# Patient Record
Sex: FEMALE | Race: WHITE | Employment: UNEMPLOYED | URBAN - METROPOLITAN AREA
[De-identification: names, ages, dates, MRNs, and addresses within clinical notes are randomized per-mention and may not be internally consistent; named-entity substitution may affect disease eponyms.]

---

## 2020-02-20 ENCOUNTER — HOSPITAL ENCOUNTER (OUTPATIENT)
Dept: MAMMOGRAPHY | Age: 55
Discharge: HOME | End: 2020-02-20
Attending: FAMILY MEDICINE
Payer: OTHER GOVERNMENT

## 2020-02-20 DIAGNOSIS — Z12.31 VISIT FOR SCREENING MAMMOGRAM: ICD-10-CM

## 2020-02-20 PROCEDURE — 77067 SCR MAMMO BI INCL CAD: CPT

## 2020-03-25 ENCOUNTER — HOSPITAL ENCOUNTER (OUTPATIENT)
Dept: MAMMOGRAPHY | Age: 55
Discharge: HOME | End: 2020-03-25
Attending: FAMILY MEDICINE
Payer: OTHER GOVERNMENT

## 2020-03-25 ENCOUNTER — HOSPITAL ENCOUNTER (OUTPATIENT)
Dept: ULTRASOUND IMAGING | Age: 55
Discharge: HOME | End: 2020-03-25
Attending: FAMILY MEDICINE
Payer: OTHER GOVERNMENT

## 2020-03-25 DIAGNOSIS — R92.8 ABNORMAL MAMMOGRAM: ICD-10-CM

## 2020-03-25 DIAGNOSIS — R92.8 ABNORMAL FINDINGS ON DIAGNOSTIC IMAGING OF BREAST: ICD-10-CM

## 2020-03-25 PROCEDURE — 77061 BREAST TOMOSYNTHESIS UNI: CPT

## 2020-03-25 PROCEDURE — 76642 ULTRASOUND BREAST LIMITED: CPT

## 2021-03-27 ENCOUNTER — TRANSCRIBE ORDER (OUTPATIENT)
Dept: SCHEDULING | Age: 56
End: 2021-03-27

## 2021-03-27 DIAGNOSIS — Z12.31 VISIT FOR SCREENING MAMMOGRAM: Primary | ICD-10-CM

## 2021-10-15 ENCOUNTER — OFFICE VISIT (OUTPATIENT)
Dept: ORTHOPEDIC SURGERY | Age: 56
End: 2021-10-15
Payer: OTHER GOVERNMENT

## 2021-10-15 VITALS
BODY MASS INDEX: 26.31 KG/M2 | WEIGHT: 143 LBS | HEIGHT: 62 IN | OXYGEN SATURATION: 100 % | HEART RATE: 112 BPM | TEMPERATURE: 98.9 F

## 2021-10-15 DIAGNOSIS — M47.812 CERVICAL ARTHRITIS: ICD-10-CM

## 2021-10-15 DIAGNOSIS — M54.12 CERVICAL RADICULOPATHY: Primary | ICD-10-CM

## 2021-10-15 PROCEDURE — 99204 OFFICE O/P NEW MOD 45 MIN: CPT | Performed by: ORTHOPAEDIC SURGERY

## 2021-10-15 RX ORDER — HYDROXYZINE 25 MG/1
TABLET, FILM COATED ORAL
COMMUNITY
Start: 2021-10-12

## 2021-10-15 RX ORDER — OMEPRAZOLE 40 MG/1
400 CAPSULE, DELAYED RELEASE ORAL
COMMUNITY
Start: 2021-08-03

## 2021-10-15 RX ORDER — CYCLOBENZAPRINE HCL 10 MG
TABLET ORAL
COMMUNITY
Start: 2021-10-01

## 2021-10-15 RX ORDER — ACETAMINOPHEN 500 MG
TABLET ORAL
COMMUNITY
Start: 2021-10-01

## 2021-10-15 RX ORDER — GABAPENTIN 300 MG/1
300 CAPSULE ORAL 3 TIMES DAILY
COMMUNITY

## 2021-10-15 RX ORDER — TRAMADOL HYDROCHLORIDE 50 MG/1
50 TABLET ORAL
COMMUNITY

## 2021-10-15 NOTE — PROGRESS NOTES
Patient: Sánchez Plascencia                MRN: [id number]       SSN: [id number]  YOB: 1965        AGE: 48 y.o. SEX: female  Body mass index is 26.16 kg/m². PCP: Izzy Garcia MD  10/15/21    CHIEF COMPLAINT: Right arm pain    HPI: Sánchez Plascencia is a 48 y.o. female patient who presents to the office today with approximately 7 months of right arm pain. She says initially the pain began around the right posterior shoulder and has subsequently started radiating down her arm to the point where she has numbness and tingling in the small ring and long finger of the right hand. She also notices decreased sensation on the right. She has tried gabapentin, anti-inflammatories, and muscle relaxants without resolution of her symptoms. She has had a right shoulder MRI as well. Past Medical History:   Diagnosis Date    Menopause        History reviewed. No pertinent family history. Current Outpatient Medications   Medication Sig Dispense Refill    acetaminophen (TYLENOL) 500 mg tablet       cyclobenzaprine (FLEXERIL) 10 mg tablet       omeprazole (PRILOSEC) 40 mg capsule 400 mg.  hydrOXYzine HCL (ATARAX) 25 mg tablet take 1 tablet by mouth three times a day if needed      gabapentin (NEURONTIN) 300 mg capsule Take 300 mg by mouth three (3) times daily.  traMADoL (ULTRAM) 50 mg tablet Take 50 mg by mouth every six (6) hours as needed for Pain.          Allergies   Allergen Reactions    Codeine Anaphylaxis       Past Surgical History:   Procedure Laterality Date    HX HYSTERECTOMY  2007       Social History     Socioeconomic History    Marital status:      Spouse name: Not on file    Number of children: Not on file    Years of education: Not on file    Highest education level: Not on file   Occupational History    Not on file   Tobacco Use    Smoking status: Current Every Day Smoker    Smokeless tobacco: Never Used   Substance and Sexual Activity    Alcohol use: Not on file    Drug use: Not on file    Sexual activity: Not on file   Other Topics Concern    Not on file   Social History Narrative    Not on file     Social Determinants of Health     Financial Resource Strain:     Difficulty of Paying Living Expenses:    Food Insecurity:     Worried About Running Out of Food in the Last Year:     [address] in the Last Year:    Transportation Needs:     Lack of Transportation (Medical):  Lack of Transportation (Non-Medical):    Physical Activity:     Days of Exercise per Week:     Minutes of Exercise per Session:    Stress:     Feeling of Stress :    Social Connections:     Frequency of Communication with Friends and Family:     Frequency of Social Gatherings with Friends and Family:     Attends [employer]ian[employer] Services:     Active Member of Clubs or Organizations:     Attends Club or Organization Meetings:     Marital Status:    Intimate Partner Violence:     Fear of Current or Ex-Partner:     Emotionally Abused:     Physically Abused:     Sexually Abused:        REVIEW OF SYSTEMS:      CON: negative for recent weight loss/gain, fever, or chills  EYE: negative for double or blurry vision  ENT: negative for hoarseness  RS:   negative for cough, URI, SOB  CV:  negative for chest pain, palpitations  GI:    negative for blood in stool, nausea/vomiting  :  negative for blood in urine  MS: As per HPI  Other systems reviewed and noted below. PHYSICAL EXAMINATION:  Visit Vitals  Pulse (!) 112   Temp 98.9 °F (37.2 °C) (Temporal)   Ht 5' 2\" (1.575 m)   Wt 143 lb (64.9 kg)   SpO2 100%   BMI 26.16 kg/m²     Body mass index is 26.16 kg/m². GENERAL: Alert and oriented x3, in no acute distress, well-developed, well-nourished. HEENT: Normocephalic, atraumatic. RESP: Non labored breathing with equal chest rise on inspiration. CV: Well perfused extremities. No cyanosis or clubbing noted.   ABDOMEN: Soft, non-tender, non-distended. Cervical Spine Examination  Neck ROM   Flexion    Full   Extension   Full   Lateral Rotation  Full  Spurling's    Neg  Focal Neuro Deficits   None  Sensation C5-T1   Full  Strength C5-T1   5/5  Tenderness C Spine   None  Stepoff C Spine   None  Paraspinal Tenderness  None  Other Findings   decreased sensation in the right lateral forearm as well as the small and ring finger on the right compared to the left. Shoulder Examination     R   L  ROM   FF  Full   Full  ER  Full   Full   IR  Full   Full  Rotator Cuff Pain   Supra  -   -   Infra  -   -   Subscap -   -  Crepitus  -   -  Effusion  -   -  Warmth  -   -   Erythema  -   -  Instability  -   -  AC Joint TTP  -   -  Clavicle   Deformity -   -   TTP  -   -  Proximal Humerus   Deformity -   -   TTP  -   -  Deltoid Strength 5   5  Biceps Strength 5   5  Biceps Deformity -   -  Biceps Groove Pain -   -  Impingement Sign -   -       IMAGING:  X-rays 2 views of the cervical spine were taken in the office today. These show multilevel cervical degeneration most significant at C4-C7. An MRI of the right shoulder was reviewed in the office today. This does not show any full-thickness rotator cuff or biceps injuries or arthritis. ASSESSMENT & PLAN  Diagnosis: Cervical arthritis with cervical radiculopathy    I believe the [place]'s problems are coming from her cervical spine. Her shoulder does not have anything significant on MRI or exam today. I recommended an MRI of her cervical spine and follow-up with our spine service for further management. This may be something that requires surgery and I will let her have that discussion with the spine team.      Electronically signed by: Mary Gant MD    Note: This note was completed using voice recognition software.   Any typographical/name errors or mistakes are unintentional.

## 2021-10-29 DIAGNOSIS — M54.12 CERVICAL RADICULOPATHY: ICD-10-CM

## 2021-10-29 DIAGNOSIS — M47.812 CERVICAL ARTHRITIS: ICD-10-CM
